# Patient Record
Sex: MALE | Race: WHITE | Employment: FULL TIME | URBAN - METROPOLITAN AREA
[De-identification: names, ages, dates, MRNs, and addresses within clinical notes are randomized per-mention and may not be internally consistent; named-entity substitution may affect disease eponyms.]

---

## 2023-11-26 ENCOUNTER — APPOINTMENT (OUTPATIENT)
Dept: GENERAL RADIOLOGY | Age: 63
End: 2023-11-26
Payer: COMMERCIAL

## 2023-11-26 ENCOUNTER — HOSPITAL ENCOUNTER (EMERGENCY)
Age: 63
Discharge: HOME | End: 2023-11-26
Payer: COMMERCIAL

## 2023-11-26 VITALS
HEART RATE: 70 BPM | HEIGHT: 74 IN | OXYGEN SATURATION: 98 % | WEIGHT: 210 LBS | RESPIRATION RATE: 20 BRPM | BODY MASS INDEX: 26.95 KG/M2 | SYSTOLIC BLOOD PRESSURE: 137 MMHG | DIASTOLIC BLOOD PRESSURE: 96 MMHG | TEMPERATURE: 98.4 F

## 2023-11-26 DIAGNOSIS — J10.1 INFLUENZA B: Primary | ICD-10-CM

## 2023-11-26 LAB

## 2023-11-26 PROCEDURE — 0202U NFCT DS 22 TRGT SARS-COV-2: CPT

## 2023-11-26 PROCEDURE — 99284 EMERGENCY DEPT VISIT MOD MDM: CPT

## 2023-11-26 PROCEDURE — 71045 X-RAY EXAM CHEST 1 VIEW: CPT

## 2023-11-26 ASSESSMENT — PAIN SCALES - GENERAL: PAINLEVEL_OUTOF10: 8

## 2023-11-26 ASSESSMENT — PAIN DESCRIPTION - LOCATION: LOCATION: OTHER (COMMENT);GENERALIZED

## 2023-11-26 ASSESSMENT — PAIN - FUNCTIONAL ASSESSMENT: PAIN_FUNCTIONAL_ASSESSMENT: 0-10

## 2023-11-27 NOTE — ED TRIAGE NOTES
Pt reports fatigue and coughing over the last couple of days. Pt also complains of body aches. No real changes with OTC meds. Pt reports grandchildren have been sick.

## 2023-11-27 NOTE — DISCHARGE INSTR - COC
{Urinary Catheter:245516194}   Colostomy/Ileostomy/Ileal Conduit: {YES / BI:81040}       Date of Last BM: ***  No intake or output data in the 24 hours ending 23  No intake/output data recorded.     Safety Concerns:     [address] [place] [address] Safety Concerns:977590016}    Impairments/Disabilities:      [address] [place] [address] Impairments/Disabilities:093751850}    Nutrition Therapy:  Current Nutrition Therapy:   [address] [place] [address] Diet List:212787463}    Routes of Feeding: {CHP DME Other Feedings:703622481}  Liquids: {Slp liquid thickness:80491}  Daily Fluid Restriction: {CHP DME Yes amt example:486939003}  Last Modified Barium Swallow with Video (Video Swallowing Test): {Done Not Done QJCF:928542470}    Treatments at the Time of Hospital Discharge:   Respiratory Treatments: ***  Oxygen Therapy:  {Therapy; copd oxygen:66198}  Ventilator:    { CC Vent HACW:831402463}    Rehab Therapies: {THERAPEUTIC INTERVENTION:2740966559}  Weight Bearing Status/Restrictions: [address] [place] [address] Weight Bearin}  Other Medical Equipment (for information only, NOT a DME order):  {EQUIPMENT:469911323}  Other Treatments: ***    Patient's personal belongings (please select all that are sent with patient):  {P DME Belongings:996116735}    RN SIGNATURE:  {Esignature:245872688}    CASE MANAGEMENT/SOCIAL WORK SECTION    Inpatient Status Date: ***    Readmission Risk Assessment Score:  Readmission Risk              Risk of Unplanned Readmission:  0           Discharging to Facility/ Agency   Name:   Address:  Phone:  Fax:    Dialysis Facility (if applicable)   Name:  Address:  Dialysis Schedule:  Phone:  Fax:    / signature: {Esignature:411170307}    PHYSICIAN SECTION    Prognosis: {Prognosis:3363474930}    Condition at Discharge: [address] [place] [address] Patient Condition:599318647}    Rehab Potential (if transferring to Rehab): {Prognosis:1818769426}    Recommended Labs or Other Treatments After Discharge: ***    Physician Certification: I certify the above

## 2023-11-27 NOTE — ED PROVIDER NOTES
Coronavirus OC43 by PCR NOT DETECTED NOTDET      SARS-CoV-2, PCR NOT DETECTED NOTDET      Human Metapneumovirus by PCR NOT DETECTED NOTDET      Rhinovirus Enterovirus PCR NOT DETECTED NOTDET      Influenza A by PCR NOT DETECTED NOTDET      Influenza B PCR Detected (A) NOTDET      Parainfluenza 1 PCR NOT DETECTED NOTDET      Parainfluenza 2 PCR NOT DETECTED NOTDET      Parainfluenza 3 PCR NOT DETECTED NOTDET      Parainfluenza 4 PCR NOT DETECTED NOTDET      Respiratory Syncytial Virus by PCR NOT DETECTED NOTDET      Bordetella parapertussis by PCR NOT DETECTED NOTDET      Bordetella pertussis by PCR NOT DETECTED NOTDET      Chlamydophila Pneumonia PCR NOT DETECTED NOTDET      Mycoplasma pneumo by PCR NOT DETECTED NOTDET     XR CHEST 1 VIEW    Narrative    Exam: Frontal chest    INDICATION: Cough and wheezing    FINDINGS:    The lungs are clear and there are no effusions. The heart size is normal and the trachea is midline. There is no mediastinal   widening. Bones are intact. Normal upper abdomen. Impression    1. Maik Coffey M.D.   11/26/2023 9:42:00 PM        XR CHEST 1 VIEW   Final Result      1. Maik Coffey M.D.    11/26/2023 9:42:00 PM            Voice dictation software was used during the making of this note. This software is not perfect and grammatical and other typographical errors may be present. This note has not been completely proofread for errors.      Oksana Danielle, [place]  11/26/23 9996

## 2023-11-27 NOTE — DISCHARGE INSTRUCTIONS
Your swab today shows that you have influenza B. No specific treatment is necessary. Continue Tylenol Cold and flu at home. Rest, stay hydrated and follow-up with your PCP.